# Patient Record
Sex: MALE | Race: OTHER | Employment: PART TIME | URBAN - METROPOLITAN AREA
[De-identification: names, ages, dates, MRNs, and addresses within clinical notes are randomized per-mention and may not be internally consistent; named-entity substitution may affect disease eponyms.]

---

## 2017-10-02 ENCOUNTER — OFFICE VISIT (OUTPATIENT)
Dept: FAMILY MEDICINE CLINIC | Age: 37
End: 2017-10-02

## 2017-10-02 VITALS
TEMPERATURE: 98.4 F | SYSTOLIC BLOOD PRESSURE: 129 MMHG | WEIGHT: 162 LBS | BODY MASS INDEX: 26.03 KG/M2 | HEART RATE: 78 BPM | HEIGHT: 66 IN | DIASTOLIC BLOOD PRESSURE: 88 MMHG

## 2017-10-02 DIAGNOSIS — S16.1XXA STRAIN OF NECK MUSCLE, INITIAL ENCOUNTER: ICD-10-CM

## 2017-10-02 DIAGNOSIS — K21.9 GASTROESOPHAGEAL REFLUX DISEASE WITHOUT ESOPHAGITIS: Primary | ICD-10-CM

## 2017-10-02 RX ORDER — METHOCARBAMOL 500 MG/1
500 TABLET, FILM COATED ORAL 3 TIMES DAILY
Qty: 12 TAB | Refills: 1 | Status: SHIPPED | OUTPATIENT
Start: 2017-10-02 | End: 2021-09-23 | Stop reason: ALTCHOICE

## 2017-10-02 RX ORDER — PHENOL/SODIUM PHENOLATE
20 AEROSOL, SPRAY (ML) MUCOUS MEMBRANE DAILY
Qty: 30 TAB | Refills: 1 | Status: SHIPPED | OUTPATIENT
Start: 2017-10-02 | End: 2021-09-23 | Stop reason: ALTCHOICE

## 2017-10-02 NOTE — PROGRESS NOTES
Kathia Elkins seen at d/c, given AVS and reviewed today's visit with patient. goodrx given to patient for both medications ordered and reviewed with patient. Pt signed medical release form to retrieve records for [facility] ED from last week's visit, he could not recall exact date 9/26 or 9/27. This has been fully explained to the patient, who indicates understanding.

## 2017-10-02 NOTE — PROGRESS NOTES
Assessment/Plan:    Diagnoses and all orders for this visit:    1. Gastroesophageal reflux disease without esophagitis  -     Omeprazole delayed release (PRILOSEC D/R) 20 mg tablet; Take 1 Tab by mouth daily. [place] 1 pastilla cada juancarlos por prince estamago    2. Strain of neck muscle, initial encounter  -     methocarbamol (ROBAXIN) 500 mg tablet; Take 1 Tab by mouth three (3) times daily. [place] 1 pastilla 3 veces al juancarlos por prince dolor muscular    Get records from ER [facility] last week  Pt declines offer for counseling for long hx of mild anxiety     Follow-up Disposition:  Return if symptoms worsen or fail to improve. Delvis Echevarria PA-C  [address] expressed understanding of this plan. An AVS was printed and given to the patient.      ----------------------------------------------------------------------    Chief Complaint   Patient presents with    Dizziness     neck pain,  pt is not feeling well since Wednesday last week. Pt was seen at [facility]         History of Present Illness:  Pt has epigastric pain that makes his neck hurt at times. He has not worked for 2 weeks now due to these sxs. He went to [facility] ER last week and was told that all tests were normal and that he has anxiety. He had been to [facility] years ago on 2 separate occasions and was told that he has anxiety. He has never seen psychiatry for this problem. He usually exercises to control his \"worrying\".  He is specifically worried about the mafia bothering his parents in [place]. He is an only child and has to send money for them to live on in [place]. 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 He took  Of  Them and did not have any relief  Lots of food types recreate the epigastric sxs  He has not tried anything for his neck pain      No past medical history on file. Current Outpatient Prescriptions   Medication Sig Dispense Refill    Omeprazole delayed release (PRILOSEC D/R) 20 mg tablet Take 1 Tab by mouth daily. [place] 1 pastilla cada juancarlos por prince estamago 30 Tab 1    methocarbamol (ROBAXIN) 500 mg tablet Take 1 Tab by mouth three (3) times daily. [place] 1 pastilla 3 veces al juancarlos por prince dolor muscular 12 Tab 1       No Known Allergies    Social History   Substance Use Topics    Smoking status: Never Smoker    Smokeless tobacco: Never Used    Alcohol use Yes      Comment: sometimes       No family history on file.     Physical Exam:     Visit Vitals    /88 (BP 1 Location: Right arm, BP Patient Position: Sitting)    Pulse 78    Temp 98.4 °F (36.9 °C) (Oral)    Ht 5' 6.34\" (1.685 m)    Wt 162 lb (73.5 kg)    BMI 25.88 kg/m2     Looks well, neuro CN 2-12 grossly intact  A&Ox3  WDWN NAD  Respirations normal and non labored  MSK exam- tender ovidio trapezius m, otherwise normal exam  abd- tender at epigastric area, otherwise no masses

## 2017-10-02 NOTE — PATIENT INSTRUCTIONS
Dolor de gerardo: Instrucciones de cuidado - [ Neck Pain: Care Instructions ]  Instrucciones de cuidado  Usted puede tener dolor de gerardo en cualquier lugar desde la parte inferior de la francisco [employer] parte superior de los hombros. Puede extenderse [employer] parte superior de la espalda o los brazos. Lesionarse, pintar un techo, dormir con el gerardo torcido, permanecer en la misma posición demasiado tiempo y [place] otras actividades pueden causar dolor de gerardo. La mayoría de los nadir de gerardo mejoran con cuidados en el hogar. Prince médico podría recomendarle un medicamento para aliviar el dolor o relajar los músculos. Podría sugerir ejercicio y fisioterapia para aumentar la flexibilidad y aliviar el estrés. Niko vez tenga que usar un collarín (cervical) especial para sostener el gerardo por un [address] La atención de seguimiento es bella parte clave de prince tratamiento y seguridad. Asegúrese de hacer y acudir a todas las citas, y llame a prince médico si está teniendo problemas. También es bella buena idea saber los resultados de abrahma exámenes y mantener bella lista de los medicamentos que jessica. ¿Cómo puede cuidarse en el [facility]? · Pruebe a usar bella almohadilla térmica a temperatura baja o mediana rufino 15 a 20 minutos cada 2 o 3 horas. Pruebe bella ducha tibia en vez de bella sesión con la almohadilla térmica. · También puede probar bella compresa de hielo rufino 10 a 15 minutos cada 2 o 3 horas. Póngase un paño [place] entre la compresa de hielo y la piel. · [employer] analgésicos (medicamentos para el dolor) exactamente melony le fueron indicados. ¨ Si el médico le recetó un analgésico, tómelo según las indicaciones. ¨ Si no está tomando un analgésico recetado, pregúntele a prince médico si puede gurwinder luis carlos de [employer]. · Si prince médico le recomienda un collarín cervical, úselo exactamente melony le fue indicado. ¿Cuándo debe pedir ayuda?   Llame a prince médico ahora mismo o busque atención médica inmediata si:  · Tiene entumecimiento nuevo o que RADHA Miller, las nalgas o las piernas. · Tiene debilidad nueva o que empeora en los brazos o las piernas. ([place] puede hacer que sea difícil ponerse de pie). · Pierde el control de la vejiga o los intestinos. Preste especial atención a los cambios en prince barrera y asegúrese de comunicarse con prince médico si:  · Prince dolor de alicia empeora. · No está mejorando después de 1 semana. · No mejora melony se esperaba. ¿Dónde puede encontrar más información en inglés? Hafsa Dash a [email or web address] Escriba V435 en la búsqueda para aprender más acerca de \"Dolor de alicia: Instrucciones de cuidado - [ Neck Pain: Care Instructions ]. \"  Revisado: 21 marzo, 2017  Versión del contenido: 11.3  © 8798-8741 Healthwise, Incorporated. Las instrucciones de cuidado fueron adaptadas bajo licencia por Good [employer] (which disclaims liability or warranty for this information). Si usted tiene [place] afección médica o sobre estas instrucciones, siempre pregunte a prince profesional de barrera. Healthwise, Incorporated niega toda garantía o responsabilidad por prince uso de esta información. Alicia: Ejercicios - [ Neck: Exercises ]  Instrucciones de cuidado  Aquí se presentan algunos ejemplos de ejercicios típicos de rehabilitación para tratar prince afección. Empiece cada ejercicio lentamente. Reduzca la intensidad del ejercicio si [place] a tener dolor. Prince médico o fisioterapeuta le dirán cuándo puede comenzar con estos ejercicios y cuáles funcionarán mejor para usted. Cómo hacer los ejercicios   Nota: El ejercicio de estiramiento debe llevar a un estiramiento suave, sin dolor. Deje de hacer cualquier ejercicio de fortalecimiento que empeore el dolor. Estiramiento del alicia    1. Sarah estiramiento funciona mejor si mantiene el hombro hacia abajo mientras se inclina en sentido contrario.  Para ayudarle a acordarse de esto, empiece por relajar los hombros y asirse levemente a abraham muslos o a prince silla. 2. Incline la francisco hacia el hombro y [place] 15 a 30 segundos. Deje que el peso de la francisco estire los músculos. 3. Si desea un pequeño estiramiento adicional, use prince mano para jalar [place] y constantemente la francisco hacia el hombro. Por ejemplo, con prince hombro derecho hacia abajo, incline prince francisco hacia la izquierda. 4. Repita de 2 a 4 veces para cada hombro. Estiramiento del gerardo en diagonal    1. Gire la francisco ligeramente en la dirección en la que esté haciendo la extensión, e incline la francisco en diagonal hacia el pecho y [place] 15 a 30 segundos. 2. Si desea un pequeño estiramiento adicional, use prince mano para jalar suave y constantemente la francisco hacia adelante en diagonal.  3. Repita de 2 a 4 veces para cada lado. Estiramiento con deslizamiento dorsal    · Siéntese o párese derecho y [employer]. · Poco a poco meta la barbilla mientras desliza la francisco hacia atrás sobre prince cuerpo. · Manténgala contando hasta 6 y luego relájela rufino 10 segundos. · Repita de 8 a 12 veces. Nota: El deslizamiento dorsal estira la parte posterior del gerardo. Si siente dolor, no lo deslice tan atrás. Algunas personas encuentran que herlinda ejercicio resulta más fácil de hacer mientras están acostadas boca Laurita Hoes, con bella bolsa de hielo en el gerardo. Estiramiento del pecho y del hombro    · Siéntese o párese derecho y deslice la francisco hacia atrás melony en el estiramiento con deslizamiento dorsal.  · Levante ambos brazos para que abraham kathya queden cerca de los oídos. · [place] bella respiración profunda, y a medida que [place], lleve los codos Dinosaur abajo y detrás de la espalda. Sentirá que los omóplatos Nicholes Members y [place], y al mismo tiempo sentirá un estiramiento en el pecho y en la parte delantera de abraham hombros.  · Manténgalos así rufino unos 6 segundos y luego relájelos rufino 10 segundos.   · Repita de 8 a 12 veces.  Fortalecimiento: Artur en la francisco    · Mueva prince francisco hacia atrás, hacia adelante y de lado a lado en bella suave presión contra abraham artur, manteniendo cada posición rufino unos 6 segundos. · Repita de 8 a 12 veces. La atención de seguimiento es bella parte clave de prince tratamiento y seguridad. Asegúrese de hacer y acudir a todas las citas, y llame a prince médico si está teniendo problemas. También es bella buena idea saber los resultados de los exámenes y mantener bella lista de los medicamentos que jessica. ¿Dónde puede encontrar más información en inglés? [place] a [email or web address] Escriba P975 en la búsqueda para aprender más acerca de \"Alicia: Ejercicios - [ Neck: Exercises ]. \"  Revisado: 21 marzo, 2017  Versión del contenido: 11.3  © 1002-7981 Healthwise, Incorporated. Las instrucciones de cuidado fueron adaptadas bajo licencia por Good Help Connections (which disclaims liability or warranty for this information). Si usted tiene [place] afección médica o sobre estas instrucciones, siempre pregunte a prince profesional de barrera. Healthwise, Incorporated niega toda garantía o responsabilidad por prince uso de esta información. Enfermedad de reflujo gastroesofágico (GERD): Instrucciones de cuidado - [ Gastroesophageal Reflux Disease (GERD): Care Instructions ]  Instrucciones de cuidado    La enfermedad de reflujo gastroesofágico (GERD, por abraham siglas en inglés) es cuando el ácido estomacal se devuelve hacia el esófago. El esófago es el conducto que va de la garganta al [facility]. Bella válvula de bella loni vía luz elena que el ácido del estómago se devuelva por herlinda conducto. Cuando usted tiene GERD, esta válvula no se micaela lo suficientemente firme. Si usted tiene síntomas leves de GERD, melony acidez estomacal, es posible que pueda controlar el problema con antiácidos o medicamentos de The [facility].  Cambiar la alimentación, bajar de peso y hacer otros cambios en el estilo de maribel también pueden ayudar a reducir los síntomas. La atención de seguimiento es bella parte clave de prince tratamiento y seguridad. Asegúrese de hacer y acudir a todas las citas, y llame a prince médico si está teniendo problemas. También es bella buena idea saber los resultados de los exámenes y mantener bella lista de los medicamentos que jessica. ¿Cómo puede cuidarse en el hogar? · [employer] medicamentos exactamente melony le fueron recetados. Llame a prince médico si agata estar teniendo problemas con prince medicamento. · Prince médico le podría recomendar medicamentos de [employer] First American. Para la indigestión leve u ocasional pueden servirle los antiácidos melony Tums, Gaviscon, Mylanta o Maalox. Prince médico también podría recomendar reductores de ácido de venta keegan, melony Pepcid AC, Tagamet HB, Zantac 75 o Prilosec. Melissa y siga todas las instrucciones de la [place]. Si [place] estos medicamentos con frecuencia, hable con prince médico.  · Cambie abraham hábitos alimenticios. ¨ Es mejor comer varias comidas pequeñas en lugar de dos o hoda comidas abundantes. ¨ Después de comer, espere de 2 a 3 horas antes de recostarse. ¨ El chocolate, la menta y el alcohol pueden empeorar la GERD. ¨ En [employer], los alimentos condimentados, los alimentos que tienen mucho ácido (melony los tomates y las naranjas) y el café pueden empeorar los síntomas de la GERD. Si abraham síntomas empeoran después de comer un determinado alimento, se recomienda que deje de comer mayranne alimento para robbi si abraham síntomas mejoran. · No fume ni masque tabaco. Fumar puede agravar la GERD. Si necesita ayuda para dejar de usar tabaco, hable con prince médico [employer] y medicamentos para dejar de usarlo. Éstos pueden aumentar abraham probabilidades de dejar el hábito para siempre.   · Si tiene síntomas de GERD rufino la noche, eleve la cabecera de prince cama entre 6 y 6 pulgadas (entre 15 y 20 cm) colocando ladrillos debajo del jaswinder de la cama o bella cuña de espuma debajo de la cabecera del chris. (Usar almohadas adicionales no funciona). · No use ropa que le ajuste mucho la parte media del cuerpo. · Baje de peso, si necesita hacerlo. Perder sólo de 5 a 10 libras (2.3 a 4.5 kg) puede ayudarle. ¿Cuándo debe pedir ayuda? Llame a prince médico ahora mismo o busque atención médica inmediata si:  · Tiene un dolor de estómago nuevo o distinto. · Dalia heces son negruzcas y parecidas al alquitrán o tienen rastros de Milly. Preste especial atención a los cambios en prince barrera y asegúrese de comunicarse con prince médico si:  · Dalia síntomas no hubbard shelly después de 2 días. · La comida parece quedarle atorada en la garganta o el pecho. ¿Dónde puede encontrar más información en inglés? Giovana Glee a [email or web address]-laureano.[email or web address] Deon [place] [id number] en la búsqueda para aprender más acerca de \"Enfermedad de reflujo gastroesofágico (GERD): Instrucciones de cuidado - [ Gastroesophageal Reflux Disease (GERD): Care Instructions ]. \"  Revisado: 9 agosto, 2016  Versión del contenido: 11.3  © 2006-2017 Healthwise, Incorporated. Las instrucciones de cuidado fueron adaptadas bajo licencia por Good [employer] (which disclaims liability or warranty for this information). Si usted tiene [place] afección médica o sobre estas instrucciones, siempre pregunte a prince profesional de brarera. Healthwise, Incorporated niega toda garantía o responsabilidad por prince uso de esta información.

## 2021-03-22 ENCOUNTER — VIRTUAL VISIT (OUTPATIENT)
Dept: FAMILY MEDICINE CLINIC | Age: 41
End: 2021-03-22

## 2021-03-22 DIAGNOSIS — M54.2 NECK PAIN: Primary | ICD-10-CM

## 2021-03-22 DIAGNOSIS — F41.8 ANXIETY ABOUT HEALTH: ICD-10-CM

## 2021-03-22 PROCEDURE — 99443 PR PHYS/QHP TELEPHONE EVALUATION 21-30 MIN: CPT | Performed by: NURSE PRACTITIONER

## 2021-03-22 NOTE — PROGRESS NOTES
: Melody Mckinley  Patient identification verified with 2 identifiers. Patient offered video visit and declined. Consent: He and/or health care decision maker has provided verbal consent to proceed: Yes   Total Time: minutes: 21-30 minutes  Pursuant to the emergency declaration under the [facility] and the [employer] and [employer] Act, this Virtual Telephone Visit was conducted to reduce the patient's risk of exposure to COVID-19. Assessment:   Diagnoses and all orders for this visit:    1. Neck pain    2. Anxiety about health      Plan:  I communicated with the patient and/or health care decision maker about the following:    -signs, sx, reasons to go to the ED discussed. Follow-up and Dispositions    · Return for next available F2F LK please (dizziness, neck/back pain). -nonpharm measures: warm compresses to the muscles that are bennie    Josesito Elaine is a 45 y.o. male evaluated via telephone on 3/22/2021. Chief Complaint   Patient presents with    Neck Pain      patient is still feeling pain and dizziness from 4 years      History of Present Illness In review of his medical records, he was seen in 2017 by Riley Landry for anxiety, reflux, pain of neck. He went to [facility] ER at that time and was told that all tests were normal and that he has anxiety. He had been to [facility] 4 years before that on 2 separate occasions and was told that he has anxiety. Feeling contractions on the left side of his neck. This started yesterday. He is having more problems with his stomach, wants the stomach medicine. Then he says it's more a problem with his back. Pain in back more when it rains. When it rains it affects the muscles on the left side, contractions, and dizziness. He doesn't want any medicines that are strong (that might make him dizzy). Denies blood in stool, no vomiting.   When he gets the pain he gets dizzy and he sleeps a lot. States the pain makes him dizzy. No physical exam performed due to telephone visit. I affirm this is a Patient Initiated Episode with a Patient who has not had a related appointment within my department in the past 7 days or scheduled within the next 24 hours. DERECK Caballero expressed understanding and agreed to this plan.

## 2021-08-10 ENCOUNTER — TELEPHONE (OUTPATIENT)
Dept: FAMILY MEDICINE CLINIC | Age: 41
End: 2021-08-10

## 2021-08-10 ENCOUNTER — VIRTUAL VISIT (OUTPATIENT)
Dept: FAMILY MEDICINE CLINIC | Age: 41
End: 2021-08-10

## 2021-08-10 DIAGNOSIS — A60.01 HERPES SIMPLEX INFECTION OF PENIS: Primary | ICD-10-CM

## 2021-08-10 PROCEDURE — 99213 OFFICE O/P EST LOW 20 MIN: CPT | Performed by: FAMILY MEDICINE

## 2021-08-10 RX ORDER — ACYCLOVIR 400 MG/1
400 TABLET ORAL 3 TIMES DAILY
Qty: 30 TABLET | Refills: 1 | Status: SHIPPED | OUTPATIENT
Start: 2021-08-10 | End: 2021-09-20 | Stop reason: SDUPTHER

## 2021-08-10 NOTE — PROGRESS NOTES
Per patient no access to vital sign equipment at work    Coordination of Care  1. Have you been to the ER, urgent care clinic since your last visit? Hospitalized since your last visit? No    2. Have you seen or consulted any other health care providers outside of the [address] [place] [address] since your last visit? Include any pap smears or colon screening. No    Does the patient need refills? NO    Learning Assessment Complete? yes  Depression Screening complete in the past 12 months? yes  1330 pm: MARTÍN called the pt to confirmed receipt of [employer] [facility] 92 coupon sent to pt via text message, reviewed coupon process. Discussed medication prescribed today.  Patient verbalized understanding

## 2021-08-10 NOTE — PROGRESS NOTES
Aleena Krueger (: 1980) is a 37 y.o. male, established patient, here for evaluation of the following chief complaint(s):   Skin Problem (pt c/o small ball on testicles  x 4 days. Pt is concerned)       ASSESSMENT/PLAN:  1. Herpes simplex infection of penis  Normal course and recurrence reviewed. Will treat with Acyclovir and discussed PRN dosing, starting medication at first signs of outbreak. Pt understood plan. Pt would like F2F visit at some point in future to do general check up. Return in about 2 weeks (around 2021) for follow up for F2F in 2 weeks for Well Check. SUBJECTIVE/OBJECTIVE:  HPI  Doxy. me visit. Blisters:  Started with clear blisters at base of penis x 4 days. Tender when he touches them. Blisters are now open. No erythema or spreading erythema. Pt states he has more stress at work recently. Review of Systems   Constitutional: Negative for fever. Genitourinary: Negative for dysuria, flank pain and hematuria. Patient-Reported Vitals 8/10/2021   Patient-Reported Weight 164 lbs       Physical Exam  CONSTITUTIONAL:  Well developed. No apparent distress. PSYCHIATRIC: Oriented to time, place, person & situation. Appropriate mood and affect. :  Cluster of 3-5mm ulcerations at base of penis with no significant swelling or erythema. Aleena Krueger is being evaluated by a Virtual Visit (video visit) encounter to address concerns as mentioned above. A caregiver was present when appropriate. Due to this being a TeleHealth encounter (During  public health emergency), evaluation of the following organ systems was limited: Vitals/Constitutional/EENT/Resp/CV/GI//MS/Neuro/Skin/Heme-Lymph-Imm.   Pursuant to the emergency declaration under the [facility] waiver authority and the [employer] and [employer] Act, this Virtual Visit was conducted with patient's (and/or legal guardian's) consent, to reduce the patient's risk of exposure to COVID-19 and provide necessary medical care. The patient (and/or legal guardian) has also been advised to contact this office for worsening conditions or problems, and seek emergency medical treatment and/or call 911 if deemed necessary. Patient identification was verified at the start of the visit: YES    Services were provided through a video synchronous discussion virtually to substitute for in-person clinic visit. Patient was located at home and provider was located in office or at home. An electronic signature was used to authenticate this note.   -- Jacob Martines MD

## 2021-08-10 NOTE — TELEPHONE ENCOUNTER
Tc from the pt with Aleah Quigley as the . The pt stated he needed an appt with the Dr the pt answered no to all Covid 19 questions. He has not had a Covid vaccine yet. The pt stated 4 days ago he got a bump on his penis, and he would like to be prescribed a cream or ointment for it. The pt was last seen by Dolly Lacy DNP in March, 2021. He stated this is a new problem. The pt was offered an F2F appt with the provider. He refused it because he stated he has to work everyday in order to pay his bills. The pt was informed the provider would have to see the bump in order to prescribe the appropriate tx probably. The pt asked for a virtual appt. The pt was asked if he would be able to do a video appt He stated yes. The first available VV appt with Doug Liriano is 08/30/21. The pt stated he will just go somewhere else then. This is not an emergency but he needs to be seen before then. The pt had said prior he preferred to be seen by the same Dr since she knows him. The pt was asked if he could see another provider that has sooner availability, and he stated yes. he was offered a VV appt with Dr Alfredo Moreland today at 1pm. The pt stated he does not think he can do an appt at 1pm bt he would try. The pt was asked what time he could do the Vv appt today, and he chose 2:30 pm, so he can get permission from his boss for the VV appt. The pt stated he can go to the [facility] for the video appt. The pt was informed the registrar will contact him and send him the link for the video appt via text. The nurse will call him to ask questions prior to the Dr's appt. Pt indicated understanding and had no questions.  Abigail Zimmerman RN

## 2021-08-27 ENCOUNTER — OFFICE VISIT (OUTPATIENT)
Dept: FAMILY MEDICINE CLINIC | Age: 41
End: 2021-08-27

## 2021-08-27 VITALS
WEIGHT: 173 LBS | HEIGHT: 66 IN | TEMPERATURE: 97.9 F | BODY MASS INDEX: 27.8 KG/M2 | HEART RATE: 86 BPM | DIASTOLIC BLOOD PRESSURE: 91 MMHG | SYSTOLIC BLOOD PRESSURE: 136 MMHG

## 2021-08-27 DIAGNOSIS — N50.3 EPIDIDYMAL CYST: ICD-10-CM

## 2021-08-27 DIAGNOSIS — M54.2 NECK PAIN: ICD-10-CM

## 2021-08-27 DIAGNOSIS — R10.32 LEFT LOWER QUADRANT ABDOMINAL PAIN: Primary | ICD-10-CM

## 2021-08-27 PROCEDURE — 99214 OFFICE O/P EST MOD 30 MIN: CPT | Performed by: NURSE PRACTITIONER

## 2021-08-27 NOTE — PROGRESS NOTES
I gave patient list of [address] [place] [address] locations/addresses  and central scheduling phone number to call to schedule the ordered tests today. Patient instructed to ask for an  if they need one. I gave patient a copy of the financial assistance application today. I have explained to patient that they must complete this after they have received their first bill from [address] [place] [address] and that they need to put the account number of a bill on the application and mail it to address at bottom of application. I told patient that it might take 90 days to hear from [address] [place] [address] and to call the billing office on each bill in the meantime to have account put on hold. Patient also told to call [facility] after 90 days to speak with  if they need further assistance with navigating this process. '  An After Visit Summary was printed and reviewed with the patient.   Deangelo Rahman

## 2021-08-27 NOTE — PROGRESS NOTES
Coordination of Care  1. Have you been to the ER, urgent care clinic since your last visit? Hospitalized since your last visit? No    2. Have you seen or consulted any other health care providers outside of the [address] [place] [address] since your last visit? Include any pap smears or colon screening. No    Does the patient need refills? NO    Learning Assessment Complete?  yes  Depression Screening complete in the past 12 months? yes
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
## 2021-09-07 ENCOUNTER — HOSPITAL ENCOUNTER (OUTPATIENT)
Dept: ULTRASOUND IMAGING | Age: 41
Discharge: HOME | End: 2021-09-07
Attending: NURSE PRACTITIONER

## 2021-09-07 DIAGNOSIS — R10.32 LEFT LOWER QUADRANT ABDOMINAL PAIN: ICD-10-CM

## 2021-09-07 PROCEDURE — 76700 US EXAM ABDOM COMPLETE: CPT

## 2021-09-13 ENCOUNTER — TELEPHONE (OUTPATIENT)
Dept: FAMILY MEDICINE CLINIC | Age: 41
End: 2021-09-13

## 2021-09-13 DIAGNOSIS — N50.89 TESTICULAR MASS: Primary | ICD-10-CM

## 2021-09-13 NOTE — TELEPHONE ENCOUNTER
Tc to the pt. Tc to [facility] scheduling for an appt for the testiclular U/S tomorrow 09/14/21 @ 2:30 pm. [place] Edwards at 2:00 pm for registration medical office building [place] go to outpatient registration. The care card application process was reviewed with the pt. He verbalized understanding.  Layne Torres RN

## 2021-09-13 NOTE — PROGRESS NOTES
Diagnoses and all orders for this visit:    1. Epididymal cyst  -     US SCROTUM/TESTICLES;  Future    Cody Stiles NP

## 2021-09-13 NOTE — TELEPHONE ENCOUNTER
US Results (most recent):  Results from [place] encounter on 09/07/21    US ABD COMP    Narrative  ULTRASOUND OF THE ABDOMEN    INDICATION: Left lower quadrant pain    COMPARISON: None. TECHNIQUE:  Sonography of the abdomen was performed. FINDINGS:    LIVER: Upper normal to mildly increased echogenicity, with coarsened  echotexture. No focal hepatic mass or intrahepatic biliary dilatation is shown. MAIN PORTAL VEIN: Patent. Appropriate hepatopetal flow. GALLBLADDER: No gallstones, wall thickening, or pericholecystic fluid. COMMON DUCT: 0.3 cm in diameter. The duct is normal caliber. PANCREAS: The visualized portions of the pancreas are normal.  SPLEEN: Normal size and homogenous echogenicity. RIGHT KIDNEY: 12.0 cm in length. No hydronephrosis, shadowing calculus, or  contour-deforming renal mass. LEFT KIDNEY: 12.6 cm in length. No hydronephrosis, shadowing calculus, or  contour-deforming renal mass. AORTA: Normal caliber in its visualized portions. INFERIOR VENA CAVA: Normal caliber in its visualized portions. The left lower quadrant was imaged with and without Valsalva maneuvers. There is  no evidence for hernia, soft tissue mass, free fluid or other abnormality. Impression  1. Upper normal to mildly increased echogenicity with coarsened echotexture of  the liver suggesting possible steatosis. Correlate with clinical history and  liver function parameters. 2. No focal abnormality in the left lower quadrant is sonographically obvious. [facility] # [id number]  Telephone call to patient. Discussed current findings. He is also concerned still about his testicle. Agreeable to testicular ultrasound. Diagnoses and all orders for this visit:    1. Testicular mass  -     US SCROTUM SOFT TISSUE; Future      Will have him schedule this and then follow up.

## 2021-09-13 NOTE — TELEPHONE ENCOUNTER
Tc from the pt. He is very concerned about his U/S test. He was given the providers message. The pt stated he would like to know more about it because he is very concerned. The pt's chart was reviewed and the providers note stated appt 1 week after U/S to discuss the results. The pt does not appear to have an appt. The provider was contacted and she has stated she is going to call the pt now.  Layne Torres RN

## 2021-09-20 ENCOUNTER — VIRTUAL VISIT (OUTPATIENT)
Dept: FAMILY MEDICINE CLINIC | Age: 41
End: 2021-09-20

## 2021-09-20 DIAGNOSIS — R21 RASH: Primary | ICD-10-CM

## 2021-09-20 PROCEDURE — 99213 OFFICE O/P EST LOW 20 MIN: CPT | Performed by: FAMILY MEDICINE

## 2021-09-20 RX ORDER — ACYCLOVIR 400 MG/1
400 TABLET ORAL 3 TIMES DAILY
Qty: 30 TABLET | Refills: 1 | Status: SHIPPED | OUTPATIENT
Start: 2021-09-20

## 2021-09-20 NOTE — PROGRESS NOTES
Intake process over phone complete, name and  verified. 1. Have you been to the ER, urgent care clinic since your last visit? Hospitalized since your last visit? No    2. Have you seen or consulted any other health care providers outside of the [address] [place] [address] since your last visit? Include any pap smears or colon screening.  No

## 2021-09-20 NOTE — PROGRESS NOTES
Ab Robbins (: 1980) is a 37 y.o. male, established patient, here for evaluation of the following chief complaint(s):   Follow-up (herpes)       ASSESSMENT/PLAN:  1. Rash  Distribution and appearance is less consistent with HSV infection. Due to poor quality video will have patient follow up for F2F exam.  Consider labs for other STI. Return for follow up for F2F next available with me this week for rash check. SUBJECTIVE/OBJECTIVE:  HPI Doxy. me visit  HSV 2:  Dx after a breakout of clear blisters, tender at base of penis. It resolved with medication. Now with rash around around face x 8 days. Rash burns but states it has improved since he restarted taking Acyclovir. Review of Systems   Constitutional: Negative for chills, diaphoresis, fatigue and fever. Patient-Reported Weight: 165lb    Physical Exam  CONSTITUTIONAL:  Well developed. No apparent distress. PSYCHIATRIC: Oriented to time, place, person & situation. Appropriate mood and affect. SKIN: Skin Exam limited due to poor video quality. Erythematous, round lesions, 5 mm in diameter along edge of his beard on both sides of face with larger target lesion of 7-8 mm on Rt side of perioral face. No blisters. Ab Robbins, was evaluated through a synchronous (real-time) audio-video encounter. The patient (or guardian if applicable) is aware that this is a billable service. Verbal consent to proceed has been obtained within the past 12 months. The visit was conducted pursuant to the emergency declaration under the [facility], [address] [place] [address] authority and the [employer] General Act. Patient identification was verified, and a caregiver was present when appropriate. The patient was located in a state where the provider was credentialed to provide care.        Patient identification was verified at the start of the visit: YES    Services were provided through a phone synchronous discussion virtually to substitute for in-person clinic visit. Patient was located at home and provider was located in office or at home. An electronic signature was used to authenticate this note.   -- Jacob Martines MD

## 2021-09-23 ENCOUNTER — OFFICE VISIT (OUTPATIENT)
Dept: FAMILY MEDICINE CLINIC | Age: 41
End: 2021-09-23

## 2021-09-23 ENCOUNTER — HOSPITAL ENCOUNTER (OUTPATIENT)
Dept: LAB | Age: 41
Discharge: HOME | End: 2021-09-23

## 2021-09-23 VITALS
BODY MASS INDEX: 27.31 KG/M2 | SYSTOLIC BLOOD PRESSURE: 119 MMHG | OXYGEN SATURATION: 99 % | TEMPERATURE: 98.6 F | WEIGHT: 174 LBS | DIASTOLIC BLOOD PRESSURE: 75 MMHG | HEIGHT: 67 IN | HEART RATE: 90 BPM

## 2021-09-23 DIAGNOSIS — Z11.3 ROUTINE SCREENING FOR STI (SEXUALLY TRANSMITTED INFECTION): ICD-10-CM

## 2021-09-23 DIAGNOSIS — N50.89 TESTICULAR MASS: ICD-10-CM

## 2021-09-23 DIAGNOSIS — G89.29 CHRONIC LEFT-SIDED LOW BACK PAIN WITHOUT SCIATICA: ICD-10-CM

## 2021-09-23 DIAGNOSIS — R21 RASH: Primary | ICD-10-CM

## 2021-09-23 DIAGNOSIS — M54.50 CHRONIC LEFT-SIDED LOW BACK PAIN WITHOUT SCIATICA: ICD-10-CM

## 2021-09-23 DIAGNOSIS — A53.0 POSITIVE RPR TEST: ICD-10-CM

## 2021-09-23 LAB
COMMENT, HOLDF: NORMAL
SAMPLES BEING HELD,HOLD: NORMAL

## 2021-09-23 PROCEDURE — 86593 SYPHILIS TEST NON-TREP QUANT: CPT

## 2021-09-23 PROCEDURE — 86780 TREPONEMA PALLIDUM: CPT

## 2021-09-23 PROCEDURE — 87591 N.GONORRHOEAE DNA AMP PROB: CPT

## 2021-09-23 PROCEDURE — 99214 OFFICE O/P EST MOD 30 MIN: CPT | Performed by: FAMILY MEDICINE

## 2021-09-23 PROCEDURE — 87389 HIV-1 AG W/HIV-1&-2 AB AG IA: CPT

## 2021-09-23 PROCEDURE — 86592 SYPHILIS TEST NON-TREP QUAL: CPT

## 2021-09-23 RX ORDER — TERBINAFINE HYDROCHLORIDE 250 MG/1
250 TABLET ORAL DAILY
Qty: 14 TABLET | Refills: 0 | Status: SHIPPED | OUTPATIENT
Start: 2021-09-23 | End: 2022-01-06 | Stop reason: ALTCHOICE

## 2021-09-23 NOTE — PROGRESS NOTES
Cristela Guajardo (: 1980) is a 37 y.o. male, established patient, here for evaluation of the following chief complaint(s):  Follow-up (From VV 2021- Rash) and Medication Refill (pt is not sure if he needs more medicine)       ASSESSMENT/PLAN:  1. Rash  Face suggests erythema multiforme and likely from his recent HSV infection. Will have him continue with Acyclovir. With other rash could be psoriasis vs fungal, will give short course of Lamisil. Discussed getting additional blood test for LFTs before long term tx for nails. 2. Routine screening for STI (sexually transmitted infection)  -     RPR; Future  -     HIV 1/2 AG/AB, 4TH GENERATION,W RFLX CONFIRM; Future  -     CT/NG/T.VAGINALIS AMPLIFICATION; Future  3. Chronic left-sided low back pain without sciatica  Musculoskeletal but since it has lasted more than 3 years and is over SI will get xray. -     XR SPINE LUMB 2 OR 3 V; Future  4. Testicular mass  Follow up with US. Follow-up and Dispositions    · Return in about 3 weeks (around 10/14/2021) for follow up for F2F in 3 weeks for rash. SUBJECTIVE:  HPI  Rash:  Rash on groin and face. Rash around groin is resolved but continues with patches around face, beard area. It seems to improve if after he shaves he uses alcohol. States he has a hx of sensitive skin. Has chronic rash/skin area on Rt anterior shin for many years. Has been told it was a fungus but has never taken medication for it. Also has nail changes in Lt thumb and B great nails. LBP:  Chronic, recurrent Lt LBP x 4 years. He had some neck pain in past also but that has resolved. No pain LE. Lt testicular swelling:  Soft, NT, present for 5 years. Is unsure if it has changes. Missed his US appt. Review of Systems   Constitutional: Negative for chills, diaphoresis, fatigue, fever and unexpected weight change. Respiratory: Negative for cough, shortness of breath and wheezing.     Cardiovascular: Negative for chest pain, palpitations and leg swelling. OBJECTIVE:  Blood pressure 119/75, pulse 90, temperature 98.6 °F (37 °C), temperature source Temporal, height 5' 6.73\" (1.695 m), weight 174 lb (78.9 kg), SpO2 99 %. Physical Exam  CONSTITUTIONAL:  Well developed. No apparent distress. PSYCHIATRIC: Oriented to time, place, person & situation. Appropriate mood and affect. MUSCULOSKELETAL: Spine appears normal with no curvature or deformity. Lumbar back has good flexion, extension, and rotation without pain. Some pain reproduced over lt SI joint and Lt  paraspinal muscles. SKIN:  Rt shin with 3x8 cm plaque with mild white dry, flakes. On face has erythematous papules in area of beard with lighter erythematous macules where he has shaved. Several lesions are target shaped on Rt cheek and chin. NAIL: onychomycotic changes in Lt great nail and B great toenails. :  Lt testicle is descended, NT, soft 1.5 cm swelling on the proximal of the testicle. No results found for this visit on 09/23/21. An electronic signature was used to authenticate this note.   -- Bria Thomas MD

## 2021-09-23 NOTE — PROGRESS NOTES
Coordination of Care  1. Have you been to the ER, urgent care clinic since your last visit? Hospitalized since your last visit? No    2. Have you seen or consulted any other health care providers outside of the [address] [place] [address] since your last visit? Include any pap smears or colon screening. No    Does the patient need refills? NO    Learning Assessment Complete?  yes  Depression Screening complete in the past 12 months? yes

## 2021-09-23 NOTE — PROGRESS NOTES
I have printed AVS and reviewed it with patient today. I have copied the provider's check out note here and have reviewed these items with the patient today: Check-out Note: follow up for F2F in 3 weeks for rash   Medication eRxd   Labs ordered. Patient verbalized understanding.  Sachin Hermosillo RN

## 2021-09-24 ENCOUNTER — TELEPHONE (OUTPATIENT)
Dept: FAMILY MEDICINE CLINIC | Age: 41
End: 2021-09-24

## 2021-09-24 LAB
HIV 1+2 AB+HIV1 P24 AG SERPL QL IA: NONREACTIVE
HIV12 RESULT COMMENT, HHIVC: NORMAL
RPR SER-TITR: ABNORMAL {TITER}

## 2021-09-24 NOTE — TELEPHONE ENCOUNTER
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 He states he is using an OTC cream and the rash has improved. He will bring the cream to the next visit.

## 2021-09-27 LAB
C TRACH RRNA SPEC QL NAA+PROBE: NEGATIVE
N GONORRHOEA RRNA SPEC QL NAA+PROBE: NEGATIVE
RPR SER QL: REACTIVE
SPECIMEN SOURCE: NORMAL
T PALLIDUM AB SER QL IA: REACTIVE
T VAGINALIS RRNA VAG QL NAA+PROBE: NEGATIVE

## 2021-09-27 NOTE — PROGRESS NOTES
The [facility] was contacted to add the additional testing for T Pallidum AB to the already collected blood specimen. The lab staff Areli stated that the test was already in process. This test is usually automatically added if RPR is positive. The provider was notified.  Shreyas Ngo RN

## 2021-09-27 NOTE — PROGRESS NOTES
Reviewed results with patient and the need for additional testing. Patient is aware we are submitting FTA-ABS for confirmation and if it cannot be added he will need to come in for additional blood draw. Patient offers that he did use some Penicillin Cream (from [facility] [employer]) last week on his rash on his face and the rash has completely resolved. He will bring the tube to his next visit to confirm. If this is correct this strongly suggests rash is Syphilis. This was reviewed with patient.

## 2021-09-28 ENCOUNTER — TELEPHONE (OUTPATIENT)
Dept: FAMILY MEDICINE CLINIC | Age: 41
End: 2021-09-28

## 2021-09-28 NOTE — PROGRESS NOTES
Tc to Carlos Ashraf at the [facility] HD. She was given the positive RPR and reactive T pallidum results, and asked if they could treat the pt with the PCN G IM there. Carlos Ashraf stated yes they could they have clinic appts on Friday's and Mondays. Carlos Ashraf stated since the pt is Greek speaking they will reach out to him and schedule the appt. It will be for either this Fri or on this coming Monday. Whichever is better for the pt. His results were faxed via CC to the HD for continuity of care.  Kendell Ramos RN

## 2021-09-28 NOTE — TELEPHONE ENCOUNTER
Faxed the labs for RPR and T pallidum to the [facility]. Through CC. They are also asking for Demo sheet. I was not able to do that remotely at this time. Will send back to [facility] to do when a printer is available.  Nia Quispe RN

## 2021-09-28 NOTE — PROGRESS NOTES
Called patient. Confirmed name and . Reviewed his positive syphilis test and need for treatment. Patient is agreeable. Treatment of choice is Penicillin G IM which we do not have. Does the [facility] have Pen G and can we arrange for patient to have the treatment given there?

## 2021-10-04 ENCOUNTER — TELEPHONE (OUTPATIENT)
Dept: FAMILY MEDICINE CLINIC | Age: 41
End: 2021-10-04

## 2021-10-04 NOTE — TELEPHONE ENCOUNTER
Spoke with Sebastian Dhillon, Health Counselor, [phone] Re. follow up for positive syphilis test.  He will be repeating testing prior to tx, treating with PCN, and do contact tracing. He has appointment at [address] [place] [address] ([address] [place] [address].) 10:00 AM Friday 10/8/2021.

## 2022-01-06 ENCOUNTER — HOSPITAL ENCOUNTER (OUTPATIENT)
Dept: LAB | Age: 42
Discharge: HOME | End: 2022-01-06

## 2022-01-06 ENCOUNTER — OFFICE VISIT (OUTPATIENT)
Dept: FAMILY MEDICINE CLINIC | Age: 42
End: 2022-01-06

## 2022-01-06 VITALS
HEART RATE: 87 BPM | WEIGHT: 173 LBS | BODY MASS INDEX: 28.82 KG/M2 | TEMPERATURE: 97.9 F | SYSTOLIC BLOOD PRESSURE: 127 MMHG | OXYGEN SATURATION: 97 % | HEIGHT: 65 IN | DIASTOLIC BLOOD PRESSURE: 87 MMHG

## 2022-01-06 DIAGNOSIS — R42 DIZZY SPELLS: ICD-10-CM

## 2022-01-06 DIAGNOSIS — R10.32 LLQ PAIN: Primary | ICD-10-CM

## 2022-01-06 DIAGNOSIS — B35.1 ONYCHOMYCOSIS: ICD-10-CM

## 2022-01-06 DIAGNOSIS — R10.32 LLQ PAIN: ICD-10-CM

## 2022-01-06 PROCEDURE — 80053 COMPREHEN METABOLIC PANEL: CPT

## 2022-01-06 PROCEDURE — 99214 OFFICE O/P EST MOD 30 MIN: CPT | Performed by: FAMILY MEDICINE

## 2022-01-06 PROCEDURE — 82607 VITAMIN B-12: CPT

## 2022-01-06 PROCEDURE — 85652 RBC SED RATE AUTOMATED: CPT

## 2022-01-06 PROCEDURE — 85027 COMPLETE CBC AUTOMATED: CPT

## 2022-01-06 PROCEDURE — 84443 ASSAY THYROID STIM HORMONE: CPT

## 2022-01-06 RX ORDER — TERBINAFINE HYDROCHLORIDE 250 MG/1
250 TABLET ORAL DAILY
Qty: 30 TABLET | Refills: 2 | Status: SHIPPED | OUTPATIENT
Start: 2022-01-06 | End: 2022-03-15 | Stop reason: SDUPTHER

## 2022-01-06 NOTE — PROGRESS NOTES
Coordination of Care  1. Have you been to the ER, urgent care clinic since your last visit? Hospitalized since your last visit? No    2. Have you seen or consulted any other health care providers outside of the [address] [place] [address] since your last visit? Include any pap smears or colon screening. No    Does the patient need refills? YES    Learning Assessment Complete?  yes  Depression Screening complete in the past 12 months? yes

## 2022-01-06 NOTE — PROGRESS NOTES
An After Visit Summary was printed and given to the patient. Went over the following instructions with the patient:    follow up for VV in 1 month for LLQ pain   follow up for F2F in 3 months for nail check and LLQ discomfort (Instructed patient to stop by registration and make two separate appointments)  Medication eRxd (coupon texted to patients cell phone and went over instructions on where to  his prescription and how to take his medicine)  Labs ordered. (Instructed to go to the [facility] outside to get blood drawn today)     Patient also requested to talk to the  about getting help paying his hospital bills.  agreed to speak with patient today. Patient verbalized understanding of instructions.    Conrado Guzman RN

## 2022-01-06 NOTE — PROGRESS NOTES
Joann Morrison (: 1980) is a 37 y.o. male, established patient, here for evaluation of the following chief complaint(s):  Abdominal Pain (f/up) and Medication Refill (Lamisil)       ASSESSMENT/PLAN:  1. LLQ pain  Consider constipation since it improved with Mg. Will start Benefiber.  -     CBC W/O DIFF; Future  -     SED RATE (ESR); Future  2. Onychomycosis  Check LFTS and restart Lamisil x 3 months. -     METABOLIC PANEL, COMPREHENSIVE; Future  3. Dizzy spells  This only occurs after rubbing his LLQ for a while and suggests a hypotensive response or anxiety. Recommended not rubbing the area excessively. -     TSH 3RD GENERATION; Future  -     VITAMIN B12; Future      Follow-up and Dispositions    · Return for follow up for VV in 1 month for LLQ pain. SUBJECTIVE:  HPI   LLQ knot/discomfort: This is recurring problems for 1 year. He feels a LLQ discomfort which if he rubs it for a while will then cause him to feel dizzy. Sometimes it occurs and other times it does not. +Constipation. It seemed to improve while he was on Mg supplement. He gets very anxious about this LLQ feeling. US Abd was unrevealing. Onychomycosis:  Rash on feet has cleared with short course of Lamisil. He now has Lt great nail changes which are worsening. Rash:  Rash on groin and face. Likely Erythema Multiforme from HSV or syphilis has resolved. RPR+: Tx and followed by HD. Review of Systems   Constitutional: Negative for chills, diaphoresis, fatigue, fever and unexpected weight change. Respiratory: Negative for cough, shortness of breath and wheezing. Cardiovascular: Negative for chest pain, palpitations and leg swelling. Neurological: Negative for syncope and light-headedness. OBJECTIVE:  Blood pressure 127/87, pulse 87, temperature 97.9 °F (36.6 °C), temperature source Temporal, height 5' 5.16\" (1.655 m), weight 173 lb (78.5 kg), SpO2 97 %. Physical Exam  CONSTITUTIONAL:  Well developed.   No apparent distress. PSYCHIATRIC: Oriented to time, place, person & situation. Appropriate mood and affect. CARDIOVASCULAR:  Regular rate and rhythm. Normal S1, S2. No extra sounds. RESPIRATORY:  Normal effort. Normal ascultation without wheezing. ABDOMEN:  Normal bowel sounds. Abdomen soft, with LLQ not well reproduced. May be some discomfort on very deep palpation of LLQ. NO guarding or rebound. No hepatosplenomegaly or masses. EXTREMITIES:  No edema. NAIL:  Lt great finger nail and significant onycholysis. No results found for this visit on 01/06/22. An electronic signature was used to authenticate this note.   -- Jose Enrique Zavaleta MD

## 2022-01-07 LAB
ALBUMIN SERPL-MCNC: 4.7 G/DL (ref 3.5–5)
ALBUMIN/GLOB SERPL: 1.3 {RATIO} (ref 1.1–2.2)
ALP SERPL-CCNC: 98 U/L (ref 45–117)
ALT SERPL-CCNC: 56 U/L (ref 12–78)
ANION GAP SERPL CALC-SCNC: 5 MMOL/L (ref 5–15)
AST SERPL-CCNC: 35 U/L (ref 15–37)
BILIRUB SERPL-MCNC: 0.4 MG/DL (ref 0.2–1)
BUN SERPL-MCNC: 15 MG/DL (ref 6–20)
BUN/CREAT SERPL: 16 (ref 12–20)
CALCIUM SERPL-MCNC: 9.3 MG/DL (ref 8.5–10.1)
CHLORIDE SERPL-SCNC: 105 MMOL/L (ref 97–108)
CO2 SERPL-SCNC: 27 MMOL/L (ref 21–32)
CREAT SERPL-MCNC: 0.94 MG/DL (ref 0.7–1.3)
ERYTHROCYTE [DISTWIDTH] IN BLOOD BY AUTOMATED COUNT: 12.8 % (ref 11.5–14.5)
ERYTHROCYTE [SEDIMENTATION RATE] IN BLOOD: 4 MM/HR (ref 0–15)
GLOBULIN SER CALC-MCNC: 3.5 G/DL (ref 2–4)
GLUCOSE SERPL-MCNC: 100 MG/DL (ref 65–100)
HCT VFR BLD AUTO: 47.1 % (ref 36.6–50.3)
HGB BLD-MCNC: 16.3 G/DL (ref 12.1–17)
MCH RBC QN AUTO: 31.7 PG (ref 26–34)
MCHC RBC AUTO-ENTMCNC: 34.6 G/DL (ref 30–36.5)
MCV RBC AUTO: 91.5 FL (ref 80–99)
NRBC # BLD: 0 K/UL (ref 0–0.01)
NRBC BLD-RTO: 0 PER 100 WBC
PLATELET # BLD AUTO: 215 K/UL (ref 150–400)
PMV BLD AUTO: 11.9 FL (ref 8.9–12.9)
POTASSIUM SERPL-SCNC: 3.9 MMOL/L (ref 3.5–5.1)
PROT SERPL-MCNC: 8.2 G/DL (ref 6.4–8.2)
RBC # BLD AUTO: 5.15 M/UL (ref 4.1–5.7)
SODIUM SERPL-SCNC: 137 MMOL/L (ref 136–145)
TSH SERPL DL<=0.05 MIU/L-ACNC: 0.83 UIU/ML (ref 0.36–3.74)
VIT B12 SERPL-MCNC: 517 PG/ML (ref 193–986)
WBC # BLD AUTO: 7.4 K/UL (ref 4.1–11.1)

## 2022-01-14 NOTE — PROGRESS NOTES
TC with the patient. I gave the pt the lab results per Doctor following notes: \"Labs do not show any signs of infection.  \" Patient verbalized understanding

## 2022-01-20 ENCOUNTER — TELEPHONE (OUTPATIENT)
Dept: FAMILY MEDICINE CLINIC | Age: 42
End: 2022-01-20

## 2022-02-07 ENCOUNTER — VIRTUAL VISIT (OUTPATIENT)
Dept: FAMILY MEDICINE CLINIC | Age: 42
End: 2022-02-07

## 2022-02-07 DIAGNOSIS — B35.1 ONYCHOMYCOSIS: ICD-10-CM

## 2022-02-07 DIAGNOSIS — R42 DIZZY SPELLS: ICD-10-CM

## 2022-02-07 DIAGNOSIS — R10.32 LLQ PAIN: Primary | ICD-10-CM

## 2022-02-07 PROCEDURE — 99442 PR PHYS/QHP TELEPHONE EVALUATION 11-20 MIN: CPT | Performed by: FAMILY MEDICINE

## 2022-02-07 RX ORDER — DULOXETIN HYDROCHLORIDE 30 MG/1
30 CAPSULE, DELAYED RELEASE ORAL DAILY
Qty: 30 CAPSULE | Refills: 1 | Status: SHIPPED | OUTPATIENT
Start: 2022-02-07 | End: 2022-03-07 | Stop reason: SINTOL

## 2022-02-07 NOTE — PROGRESS NOTES
Vivia Paget (: 1980) is a 37 y.o. male, established patient, Virtual Visit for evaluation of the following chief complaint(s):   Abdominal Pain (LLQ pain f/u)       ASSESSMENT/PLAN:  1. LLQ pain  Constipation has resolved. No LLQ pain. He states he still feels the LLQ knots but they do not bother him except for that they may be contributing to his dizzy/uneasy spells. 2. Onychomycosis  Tolerating Lamisil, recheck LFTs  -     HEPATIC FUNCTION PANEL; Future  3. Dizzy spells  Associated with some anxiety and tension. Will give trial of Cymbalta. Return for follow up for VV in 4 weeks for dizzy spells. .    SUBJECTIVE/OBJECTIVE:  HPI  LLQ knot/discomfort: This is recurring problem for 1 year. He feels a LLQ discomfort which if he rubs it for a while will then cause him to feel dizzy. LLQ pressure and constipation has resolved with Benefiber. He continues with dizzy spells. He relates it to the knots in his LLQ but states the dizziness, uneasy feeling can occur even when these lumps are not bothering him. He will get associated anxiety/feeling tense also. This occurs at work, the day after he is with his GF, and at other times. US Abd was unrevealing for hernia or masses. Onychomycosis:  Patient is taking Lamisil regularly. Review of Systems     No data recorded    Physical Exam    On this date 2022 I have spent 15 minutes reviewing previous notes, test results and face to face (virtual) with the patient discussing the diagnosis and importance of compliance with the treatment plan as well as documenting on the day of the visit. Vivia Paget, was evaluated through a synchronous (real-time) audio-video encounter. The patient (or guardian if applicable) is aware that this is a billable service, which includes applicable co-pays. Verbal consent to proceed has been obtained.  The visit was conducted pursuant to the emergency declaration under the [address] Emergencies Act, [facility] waiver authority and the Coronavirus Preparedness and Response Supplemental Appropriations Act. Patient identification was verified, and a caregiver was present when appropriate. The patient was located at home in a state where the provider was licensed to provide care. Patient identification was verified at the start of the visit: YES    Services were provided through a phone synchronous discussion virtually to substitute for in-person clinic visit. Patient was located at home and provider was located in office or at home. An electronic signature was used to authenticate this note.   -- Zelalem Lang MD

## 2022-02-07 NOTE — PROGRESS NOTES
1. Have you been to the ER, urgent care clinic since your last visit? Hospitalized since your last visit? No    2. Have you seen or consulted any other health care providers outside of the [address] [place] [address] since your last visit? Include any pap smears or colon screening. No     AVS reviewed over the phone with the patient. Education on his new medication Cymbalta was reinforced. No GoodRX coupons available. I have reviewed the provider's instructions with the patient, answering all questions to his satisfaction. Patient verbalized understanding.   Nahomy Landeros RN

## 2022-03-07 ENCOUNTER — VIRTUAL VISIT (OUTPATIENT)
Dept: FAMILY MEDICINE CLINIC | Age: 42
End: 2022-03-07

## 2022-03-07 DIAGNOSIS — B35.1 ONYCHOMYCOSIS: ICD-10-CM

## 2022-03-07 DIAGNOSIS — R42 DIZZY SPELLS: Primary | ICD-10-CM

## 2022-03-07 PROCEDURE — 99442 PR PHYS/QHP TELEPHONE EVALUATION 11-20 MIN: CPT | Performed by: FAMILY MEDICINE

## 2022-03-07 RX ORDER — CITALOPRAM 10 MG/1
10 TABLET ORAL DAILY
Qty: 30 TABLET | Refills: 0 | Status: SHIPPED | OUTPATIENT
Start: 2022-03-07

## 2022-03-07 NOTE — PROGRESS NOTES
Bere Curtis (: 1980) is a 37 y.o. male, established patient, Virtual Visit for evaluation of the following chief complaint(s):   Dizziness (f/u) and Mass (f/u, Patient states that he has a lump on left side of abdomen and when he touches it, it makes him dizzy.)       ASSESSMENT/PLAN:  1. Dizzy spells  Sporadic, he feels he just needs something PRN. May try Celexa. 2. Onychomycosis  Improved with Lamisil. Check LFTs and continue tx. Return for Schedule for lab draw for labs, F2F visit in 2 months. SUBJECTIVE/OBJECTIVE:  HPI  LLQ knot/discomfort:  Patient tried Cymbalta and caused drowsiness after 1 dose so he stopped. His dizzy spells continue off/on but not severe, not daily. He feels it is worse on days when it is going to rain. Overall he feels it is less than before. This is recurring problem for 1 year. LLQ pressure and constipation has resolved with Benefiber. US Abd was unrevealing for hernia or masses. Onychomycosis:  Patient is taking Lamisil regularly. Nails have improved. Review of Systems     No data recorded    Physical Exam    On this date 2022 I have spent 14 minutes reviewing previous notes, test results and face to face (virtual) with the patient discussing the diagnosis and importance of compliance with the treatment plan as well as documenting on the day of the visit. Bere Curtis, was evaluated through a synchronous (real-time) audio-video encounter. The patient (or guardian if applicable) is aware that this is a billable service, which includes applicable co-pays. Verbal consent to proceed has been obtained. The visit was conducted pursuant to the emergency declaration under the [address], [facility] authority and the "[employer], [employer]" and [employer] General Act. Patient identification was verified, and a caregiver was present when appropriate.  The patient was located at home in a state where the provider was licensed to provide care. Patient identification was verified at the start of the visit: YES    Services were provided through a phone synchronous discussion virtually to substitute for in-person clinic visit. Patient was located at home and provider was located in office or at home. An electronic signature was used to authenticate this note.   -- Dipika Steiner MD

## 2022-03-07 NOTE — PROGRESS NOTES
Coordination of Care  1. Have you been to the ER, urgent care clinic since your last visit? Hospitalized since your last visit? No    2. Have you seen or consulted any other health care providers outside of the [address] [place] [address] since your last visit? Include any pap smears or colon screening. No    Does the patient need refills? Yes    Learning Assessment Complete?  yes  Depression Screening complete in the past 12 months? yes

## 2022-03-07 NOTE — PROGRESS NOTES
Check-out Note: follow up for F2F in 2 months for dizzienss   Schedule for lab draw for labs (ordered last visit)   Medication eRxd     Tc to the pt for discharge. The call was made without an . The pt was offered one. He was informed the psr would contact him for the appts, for labs, and follow up appt with the Dr in 4 weeks. The medication was reviewed with him. He was told the Celexa 10 mg Qty #30, 0 refills would cost $4 without a coupon. The pt's Pharmacy was verified with him. He verbalized understanding, and denied any questions.  Evangelist Purvis RN

## 2022-03-14 ENCOUNTER — HOSPITAL ENCOUNTER (OUTPATIENT)
Dept: LAB | Age: 42
Discharge: HOME | End: 2022-03-14

## 2022-03-14 ENCOUNTER — LAB ONLY (OUTPATIENT)
Dept: FAMILY MEDICINE CLINIC | Age: 42
End: 2022-03-14

## 2022-03-14 DIAGNOSIS — B35.1 ONYCHOMYCOSIS: ICD-10-CM

## 2022-03-14 PROCEDURE — 80076 HEPATIC FUNCTION PANEL: CPT

## 2022-03-15 LAB
ALBUMIN SERPL-MCNC: 4 G/DL (ref 3.5–5)
ALBUMIN/GLOB SERPL: 1.2 {RATIO} (ref 1.1–2.2)
ALP SERPL-CCNC: 90 U/L (ref 45–117)
ALT SERPL-CCNC: 54 U/L (ref 12–78)
AST SERPL-CCNC: 26 U/L (ref 15–37)
BILIRUB DIRECT SERPL-MCNC: 0.1 MG/DL (ref 0–0.2)
BILIRUB SERPL-MCNC: 0.5 MG/DL (ref 0.2–1)
GLOBULIN SER CALC-MCNC: 3.4 G/DL (ref 2–4)
PROT SERPL-MCNC: 7.4 G/DL (ref 6.4–8.2)

## 2022-03-15 RX ORDER — TERBINAFINE HYDROCHLORIDE 250 MG/1
250 TABLET ORAL DAILY
Qty: 30 TABLET | Refills: 2 | Status: SHIPPED | OUTPATIENT
Start: 2022-03-15 | End: 2022-05-27 | Stop reason: SDUPTHER

## 2022-03-15 NOTE — PROGRESS NOTES
Spoke with patient. LFTs are normal may continue to take Lamisil. He was unaware of his refills on bottle. Will eRx new prescription and described how to get refills.

## 2022-05-12 ENCOUNTER — TELEPHONE (OUTPATIENT)
Dept: FAMILY MEDICINE CLINIC | Age: 42
End: 2022-05-12

## 2022-05-12 NOTE — TELEPHONE ENCOUNTER
The pt denied . He verified his name and . He stated his face on the right side has psoriasis on face. He believes it is caused by the Terbinafine medication. He has more psoriasis. The pt was told to stop the medication and he was asked if he needed an appt for the Dr to look at the Psoriasis.  He stated yes he would. thania pt was scheduled an appt for 22 at [facility] [place], RN

## 2022-05-12 NOTE — TELEPHONE ENCOUNTER
Good Morning,    Patient called, and stated he believes the last medication he was prescribed for a fungal infection has given him a reaction of a facial rash/skin condition (he called it psoriasis). Patient said he has been taken this medication since March, and the skin problem for the last week or two. He would like to know if he should continue taking this medication, or should he stop it? Thank you.

## 2022-05-27 ENCOUNTER — OFFICE VISIT (OUTPATIENT)
Dept: FAMILY MEDICINE CLINIC | Age: 42
End: 2022-05-27

## 2022-05-27 ENCOUNTER — HOSPITAL ENCOUNTER (OUTPATIENT)
Dept: LAB | Age: 42
Discharge: HOME | End: 2022-05-27

## 2022-05-27 VITALS
DIASTOLIC BLOOD PRESSURE: 80 MMHG | SYSTOLIC BLOOD PRESSURE: 120 MMHG | BODY MASS INDEX: 27.8 KG/M2 | OXYGEN SATURATION: 99 % | TEMPERATURE: 97.6 F | WEIGHT: 173 LBS | HEART RATE: 91 BPM | HEIGHT: 66 IN

## 2022-05-27 DIAGNOSIS — B35.1 ONYCHOMYCOSIS: ICD-10-CM

## 2022-05-27 DIAGNOSIS — B35.1 ONYCHOMYCOSIS: Primary | ICD-10-CM

## 2022-05-27 DIAGNOSIS — L40.9 PSORIASIS: ICD-10-CM

## 2022-05-27 DIAGNOSIS — R10.32 LLQ PAIN: ICD-10-CM

## 2022-05-27 DIAGNOSIS — R42 DIZZY SPELLS: ICD-10-CM

## 2022-05-27 PROCEDURE — 99214 OFFICE O/P EST MOD 30 MIN: CPT | Performed by: FAMILY MEDICINE

## 2022-05-27 RX ORDER — TERBINAFINE HYDROCHLORIDE 250 MG/1
250 TABLET ORAL DAILY
Qty: 30 TABLET | Refills: 2 | Status: SHIPPED | OUTPATIENT
Start: 2022-05-27

## 2022-05-27 NOTE — PROGRESS NOTES
Coordination of Care  1. Have you been to the ER, urgent care clinic since your last visit? Hospitalized since your last visit? No    2. Have you seen or consulted any other health care providers outside of the [address] [place] [address] since your last visit? Include any pap smears or colon screening. No    Does the patient need refills? NO    Learning Assessment Complete?  yes  Depression Screening complete in the past 12 months? yes

## 2022-05-27 NOTE — PROGRESS NOTES
I have printed AVS and reviewed it with patient today. Patient verbalized understanding. I reviewed with patient medications sent to pharmacy and how the medication is taken. Patient verbalized understanding. The patient was texted [employer] coupons for prescriptions and I explained how the coupons are used. Patient verbalized understanding. I instructed patient to schedule a follow-up appointment prior to leaving today. Patient verbalized understanding. Patient correctly stated his full name and date of birth prior to the information shared.  Briana with the [place] [id number] assisted with this discharge.  Martin Olivarez RN

## 2022-05-27 NOTE — PROGRESS NOTES
Benson Hackett (: 1980) is a 37 y.o. male, established patient, here for evaluation of the following chief complaint(s):  Nail Problem (Hand fungal infection f/up) and Abdominal Pain (f/up)       ASSESSMENT/PLAN:  1. Onychomycosis  Reassurance and I don't think it was contributing to his psoriasis. Resume Lamisil for toenails for additional 3 months.  -     HEPATIC FUNCTION PANEL; Future  2. Psoriasis  It has improved with Spring and discussed phototherapy. He will try some sun exposure and consider steroid cream if not improved. 3. Dizzy spells  They are worse with overcast days and worries strongly suggest anxiety. I asked him to try Celexa. Follow-up and Dispositions    · Return for follow up for F2F in 3 months for onychomycosis. SUBJECTIVE:  HPI  Onychomycosis:  Patient was taking Lamisil regularly but stopped because his Rt shin rash flared and he thought is was the lamisil. Finger nails have cleared. Toe nails still have some affected area. Rash:  Rt leg rash present off/on for many years (5+). Has improved over the past few weeks. It has not grown. Dizziness:  Feels much more stressed when it is overcast, raining. Admits to worrying about his health and LLQ discomfort a lot. Has not tried Celexa because he worries about side effects. Cymbalta caused drowsiness. LLQ knot/discomfort:  LLQ pressure is controlled if he controls his constipation with Benefiber and MOM. He stopped exercising vigorously 6 years ago because he felt his made his abdominal sx worse. His LLQ discomfort and dizziness seems to flare when it is overcast.  He states the knot in the LLQ is still present at times but moves around. It is worse when it is overcast and when he worries. He feels his LLQ knot is tied to his dizziness because when he notices it and pushes on it he can get dizzy. Cymbalta caused drowsiness. This is recurring problem for 1+ year.   LLQ pressure and constipation has resolved with Benefiber. US Abd was unrevealing for hernia or masses. Review of Systems   Constitutional: Negative for chills, diaphoresis and fever. Gastrointestinal: Negative for abdominal pain, blood in stool, diarrhea and nausea. Genitourinary: Negative for dysuria. OBJECTIVE:  Blood pressure 120/80, pulse 91, temperature 97.6 °F (36.4 °C), temperature source Temporal, height 5' 5.75\" (1.67 m), weight 173 lb (78.5 kg), SpO2 99 %. Physical Exam  CONSTITUTIONAL:  Well developed. No apparent distress. PSYCHIATRIC: Oriented to time, place, person & situation. Appropriate mood and affect. ABDOMEN:  Normal bowel sounds. Abdomen soft, non tender. No hepatosplenomegaly or masses. NO masses or dizziness were reproduced today. Pt states it sometimes is difficulty to find. EXTREMITIES:  No edema. SKIN: 6x3 cm dry plaque on Rt shin. No results found for this visit on 05/27/22. An electronic signature was used to authenticate this note.   -- Ariadna Florentino MD

## 2022-05-28 LAB
ALBUMIN SERPL-MCNC: 4.2 G/DL (ref 3.5–5)
ALBUMIN/GLOB SERPL: 1.1 {RATIO} (ref 1.1–2.2)
ALP SERPL-CCNC: 87 U/L (ref 45–117)
ALT SERPL-CCNC: 44 U/L (ref 12–78)
AST SERPL-CCNC: 25 U/L (ref 15–37)
BILIRUB DIRECT SERPL-MCNC: 0.1 MG/DL (ref 0–0.2)
BILIRUB SERPL-MCNC: 0.4 MG/DL (ref 0.2–1)
COMMENT, HOLDF: NORMAL
GLOBULIN SER CALC-MCNC: 3.9 G/DL (ref 2–4)
PROT SERPL-MCNC: 8.1 G/DL (ref 6.4–8.2)
SAMPLES BEING HELD,HOLD: NORMAL

## 2022-05-28 PROCEDURE — 80076 HEPATIC FUNCTION PANEL: CPT

## 2022-05-31 NOTE — PROGRESS NOTES
Spoke with patient. LFTs are normal continue with Lamisil. Patient continues to have LLQ discomfort. Will get CT abd/pelvis for LLQ due to chronic recurrent nature of his sx.

## 2022-06-08 NOTE — PROGRESS NOTES
T/c with the patient. I gave the patient the information about the CT abd/pelvis for LLQ, according to Dr Javi Bay MD, following notes: \"Spoke with patient.  LFTs are normal continue with Lamisil. Patient continues to have LLQ discomfort.  Will get CT abd/pelvis for LLQ due to chronic recurrent nature of his sx. \" I made the appointment for the patient with the patient on the other line.  Appointment was schedule on 6/23/2022 at 7:30 am. Patient was instructed to  one or two day before the contrast he has to drink two hours before his test. Patient has to arrive to his appointment at 7 am at [facility]. Patient verbalized understanding

## 2022-06-23 ENCOUNTER — HOSPITAL ENCOUNTER (OUTPATIENT)
Dept: CT IMAGING | Age: 42
Discharge: HOME | End: 2022-06-23
Attending: FAMILY MEDICINE

## 2022-06-23 DIAGNOSIS — R10.32 LLQ PAIN: ICD-10-CM

## 2022-06-23 PROCEDURE — 74011000636 HC RX REV CODE- 636: Performed by: RADIOLOGY

## 2022-06-23 PROCEDURE — 74177 CT ABD & PELVIS W/CONTRAST: CPT

## 2022-06-23 RX ADMIN — IOPAMIDOL 100 ML: 755 INJECTION, SOLUTION INTRAVENOUS at 09:59

## 2022-06-29 NOTE — PROGRESS NOTES
Spoke with patient and reviewed CT results. Normal results: no masses, infection. Patient continued to believe that there was a mass present that was causing him to feel tired, especially when it is overcast.  We discussed how stress and anxiety can also cause these sx. Patient admits to feeling very stressed at times and worries. He acknowledged that his stress could be causing some of his physical sx. Discussed trying Celexa regularly but patient preferred to defer medication at this time. He feels reassured that there was nothing found and will try to deal with his stress/worries by other means. He plans to follow up if not feeling better.

## 2023-02-16 ENCOUNTER — TELEPHONE (OUTPATIENT)
Dept: FAMILY MEDICINE CLINIC | Age: 43
End: 2023-02-16

## 2023-02-16 NOTE — TELEPHONE ENCOUNTER
OW Called patient to assist. Patient is trying to schedule an appt with a [facility] provider. OW explained the process and gave him [facility] appt. Line and business hours. OW also sent information to patient via text message after telephone encounter.

## 2023-02-20 ENCOUNTER — TELEPHONE (OUTPATIENT)
Dept: FAMILY MEDICINE CLINIC | Age: 43
End: 2023-02-20

## 2023-02-20 ENCOUNTER — OFFICE VISIT (OUTPATIENT)
Dept: FAMILY MEDICINE CLINIC | Age: 43
End: 2023-02-20

## 2023-02-20 VITALS
DIASTOLIC BLOOD PRESSURE: 86 MMHG | SYSTOLIC BLOOD PRESSURE: 123 MMHG | BODY MASS INDEX: 27.16 KG/M2 | TEMPERATURE: 97.7 F | OXYGEN SATURATION: 99 % | HEIGHT: 66 IN | WEIGHT: 169 LBS | HEART RATE: 72 BPM

## 2023-02-20 DIAGNOSIS — Z23 ENCOUNTER FOR IMMUNIZATION: ICD-10-CM

## 2023-02-20 DIAGNOSIS — A63.0 GENITAL WARTS: Primary | ICD-10-CM

## 2023-02-20 PROCEDURE — 90651 9VHPV VACCINE 2/3 DOSE IM: CPT

## 2023-02-20 PROCEDURE — 99214 OFFICE O/P EST MOD 30 MIN: CPT | Performed by: FAMILY MEDICINE

## 2023-02-20 RX ORDER — IMIQUIMOD 12.5 MG/.25G
CREAM TOPICAL
Qty: 12 PACKET | Refills: 0 | Status: SHIPPED | OUTPATIENT
Start: 2023-02-20

## 2023-02-20 NOTE — PROGRESS NOTES
Khadijah Mendosa is a 40 y.o. male   Chief Complaint   Patient presents with    Warts     Pt reports two warts in his groin area. He first noticed them 8 months ago and they have not been treated previously. He states that they appear to be getting bigger. ASSESSMENT AND PLAN:    1. Genital warts  Topical imiquimod. If do not resolve, can consider cryo in office. STI screen. HPV immunization to prevent additional strains    - imiquimod (ALDARA) 5 % cream; Apply a thin layer 3 times per week (on alternate days) prior to bedtime; leave on skin for 6 to 10 hours, then remove with mild soap and water. Continue until  clearance of warts or for a maximum duration of therapy of 16 weeks. Dispense: 12 Packet; Refill: 0  - [place] / GC-AMPLIFIED; Future  - T PALLIDUM SCREEN W/REFLEX; Future  - HIV 1/2 AG/AB, 4TH GENERATION,W RFLX CONFIRM; Future    SUBJECTIVE:    HPI:  Khadijah Mendosa is a 40 y.o. male who presents with two genital warts - one big and one small. He noticed the bigger one about 8 months ago and it has grown. NO tenderness or itching. He notes that he was with a woman about a year ago and was diagnosed with syphilis. He completed treatment and hasn't had any further symptoms. He believes this came from the same encounter. Denies any new partners. Review of Systems   Constitutional:  Negative for fever and malaise/fatigue. Eyes:  Negative for blurred vision. Respiratory:  Negative for cough and shortness of breath. Cardiovascular:  Negative for chest pain, palpitations and leg swelling. Gastrointestinal:  Negative for abdominal pain, constipation, diarrhea, nausea and vomiting. Neurological:  Negative for dizziness and headaches.        /86 (BP 1 Location: Left upper arm)   Pulse 72   Temp 97.7 °F (36.5 °C) (Temporal)   Ht 5' 5.55\" (1.665 m)   Wt 169 lb (76.7 kg)   SpO2 99%   BMI 27.65 kg/m²     Physical Exam  Genitourinary:     Comments: 2 verrucous lesions on pubic mons. Right lesion is larger. No additional lesions visualized.   Penis and scrotum appear grossly normal.

## 2023-02-20 NOTE — PROGRESS NOTES
Svitlana Donnie seen at d/c, full name and  verified. After Visit Summary provided and reviewed along with discharge instructions. Advised patient to schedule follow up immunization appointment (HPV #2) before he leaves today. Food [employer] resources provided. HPV vaccine requested by patient. VIIS historical immunizations reviewed and consent form obtained. VIS given to patient and possible side effects explained, including those which would warrant emergent evaluation. Advised patient to wait 15 minutes after vaccine administration to monitor for adverse reactions, patient verbalizes understanding. Patient denied fever, allergies, and previous immunization reactions. Immunization administered by this RN per protocol and recorded in EMR and VIIS. Lab slip with orders completed and handed to patient as well as laboratory locations. Patient advised to have lab work completed as soon as possible. Instructions were reviewed and patient verbalized understanding. Medication list and possible side effects reviewed. Patient to apply Imiquimod cream three time a week (M, W, F) before bedtime. Teach back method was utilized to ensure patient accurately understands how to and when to take prescribed medication. GoodRx coupon provided and explained how to use. Opportunity for questions provided, patient verbalizes understanding.

## 2023-02-20 NOTE — TELEPHONE ENCOUNTER
Tc to the pt returning his call. He stated he already has an appt today. We confirmed the date and time with him.  Dennie Cloud, RN

## 2023-02-20 NOTE — PROGRESS NOTES
Coordination of Care  1. Have you been to the ER, urgent care clinic since your last visit? Hospitalized since your last visit? No    2. Have you seen or consulted any other health care providers outside of the [address] [place] [address] since your last visit? Include any pap smears or colon screening. No    Does the patient need refills? NO    Learning Assessment Complete?  yes   Depression Screening complete in the past 12 months? yes

## 2023-02-24 ENCOUNTER — HOSPITAL ENCOUNTER (OUTPATIENT)
Dept: LAB | Age: 43
Discharge: HOME | End: 2023-02-24

## 2023-02-24 PROCEDURE — 86592 SYPHILIS TEST NON-TREP QUAL: CPT

## 2023-02-24 PROCEDURE — 86780 TREPONEMA PALLIDUM: CPT

## 2023-02-24 PROCEDURE — 36415 COLL VENOUS BLD VENIPUNCTURE: CPT

## 2023-02-24 PROCEDURE — 87389 HIV-1 AG W/HIV-1&-2 AB AG IA: CPT

## 2023-02-24 PROCEDURE — 87491 CHLMYD TRACH DNA AMP PROBE: CPT

## 2023-02-25 LAB
HIV 1+2 AB+HIV1 P24 AG SERPL QL IA: NONREACTIVE
HIV12 RESULT COMMENT, HHIVC: NORMAL

## 2023-02-28 LAB
C TRACH RRNA SPEC QL NAA+PROBE: NEGATIVE
N GONORRHOEA RRNA SPEC QL NAA+PROBE: NEGATIVE
RPR SER QL: REACTIVE
RPR SER-TITR: ABNORMAL {TITER}
SPECIMEN SOURCE: NORMAL
T PALLIDUM AB SER QL IA: REACTIVE

## 2023-03-01 NOTE — PROGRESS NOTES
Please let him know that his STI screen was negative for Chlamydia, Gonorrhea, and HIV. His Syphilis test is still reactive because he is still making antibodies, but the levels have been appropriately lowered so the treatment was successful. No further treatment needed. We have followup later this month. Thanks.

## 2023-05-22 RX ORDER — IMIQUIMOD 12.5 MG/.25G
CREAM TOPICAL
COMMUNITY
Start: 2023-02-20

## 2024-11-06 NOTE — TELEPHONE ENCOUNTER
Agree.  Thank you.
Tc to the pt he has an over due xray for his back. Int #  [id number]. The order was entered 09/23/21. He has been seen in the clinic since this order. The pt stated he did not need the xray now. It is ok to cancel it. Order canceled.  Sandy Lewis RN
no weight-bearing restrictions